# Patient Record
Sex: MALE | Race: WHITE | Employment: OTHER | URBAN - METROPOLITAN AREA
[De-identification: names, ages, dates, MRNs, and addresses within clinical notes are randomized per-mention and may not be internally consistent; named-entity substitution may affect disease eponyms.]

---

## 2018-11-29 ENCOUNTER — ANESTHESIA EVENT (OUTPATIENT)
Dept: ENDOSCOPY | Facility: HOSPITAL | Age: 73
End: 2018-11-29
Payer: MEDICARE

## 2018-11-29 ENCOUNTER — HOSPITAL ENCOUNTER (OUTPATIENT)
Facility: HOSPITAL | Age: 73
Setting detail: HOSPITAL OUTPATIENT SURGERY
Discharge: HOME | End: 2018-11-29
Attending: INTERNAL MEDICINE | Admitting: INTERNAL MEDICINE
Payer: MEDICARE

## 2018-11-29 ENCOUNTER — ANESTHESIA (OUTPATIENT)
Dept: ENDOSCOPY | Facility: HOSPITAL | Age: 73
End: 2018-11-29
Payer: MEDICARE

## 2018-11-29 VITALS
HEIGHT: 66 IN | SYSTOLIC BLOOD PRESSURE: 127 MMHG | WEIGHT: 178 LBS | RESPIRATION RATE: 12 BRPM | TEMPERATURE: 97 F | DIASTOLIC BLOOD PRESSURE: 75 MMHG | OXYGEN SATURATION: 97 % | BODY MASS INDEX: 28.61 KG/M2 | HEART RATE: 73 BPM

## 2018-11-29 DIAGNOSIS — K86.2 PANCREAS CYST: ICD-10-CM

## 2018-11-29 DIAGNOSIS — Z86.010 HISTORY OF ADENOMATOUS POLYP OF COLON: ICD-10-CM

## 2018-11-29 PROCEDURE — 0DBP8ZX EXCISION OF RECTUM, VIA NATURAL OR ARTIFICIAL OPENING ENDOSCOPIC, DIAGNOSTIC: ICD-10-PCS | Performed by: INTERNAL MEDICINE

## 2018-11-29 PROCEDURE — 0DBN8ZX EXCISION OF SIGMOID COLON, VIA NATURAL OR ARTIFICIAL OPENING ENDOSCOPIC, DIAGNOSTIC: ICD-10-PCS | Performed by: INTERNAL MEDICINE

## 2018-11-29 PROCEDURE — BD47ZZZ ULTRASONOGRAPHY OF GASTROINTESTINAL TRACT: ICD-10-PCS | Performed by: INTERNAL MEDICINE

## 2018-11-29 PROCEDURE — 0DBK8ZX EXCISION OF ASCENDING COLON, VIA NATURAL OR ARTIFICIAL OPENING ENDOSCOPIC, DIAGNOSTIC: ICD-10-PCS | Performed by: INTERNAL MEDICINE

## 2018-11-29 PROCEDURE — 88305 TISSUE EXAM BY PATHOLOGIST: CPT | Performed by: INTERNAL MEDICINE

## 2018-11-29 PROCEDURE — 0DJ08ZZ INSPECTION OF UPPER INTESTINAL TRACT, VIA NATURAL OR ARTIFICIAL OPENING ENDOSCOPIC: ICD-10-PCS | Performed by: INTERNAL MEDICINE

## 2018-11-29 PROCEDURE — 82962 GLUCOSE BLOOD TEST: CPT

## 2018-11-29 RX ORDER — DEXTROSE MONOHYDRATE 25 G/50ML
50 INJECTION, SOLUTION INTRAVENOUS
Status: DISCONTINUED | OUTPATIENT
Start: 2018-11-29 | End: 2018-11-29

## 2018-11-29 RX ORDER — NALOXONE HYDROCHLORIDE 0.4 MG/ML
80 INJECTION, SOLUTION INTRAMUSCULAR; INTRAVENOUS; SUBCUTANEOUS AS NEEDED
Status: DISCONTINUED | OUTPATIENT
Start: 2018-11-29 | End: 2018-11-29

## 2018-11-29 RX ORDER — SODIUM CHLORIDE, SODIUM LACTATE, POTASSIUM CHLORIDE, CALCIUM CHLORIDE 600; 310; 30; 20 MG/100ML; MG/100ML; MG/100ML; MG/100ML
INJECTION, SOLUTION INTRAVENOUS CONTINUOUS PRN
Status: DISCONTINUED | OUTPATIENT
Start: 2018-11-29 | End: 2018-11-29 | Stop reason: SURG

## 2018-11-29 RX ORDER — LIDOCAINE HYDROCHLORIDE 10 MG/ML
INJECTION, SOLUTION EPIDURAL; INFILTRATION; INTRACAUDAL; PERINEURAL AS NEEDED
Status: DISCONTINUED | OUTPATIENT
Start: 2018-11-29 | End: 2018-11-29 | Stop reason: SURG

## 2018-11-29 RX ORDER — SODIUM CHLORIDE, SODIUM LACTATE, POTASSIUM CHLORIDE, CALCIUM CHLORIDE 600; 310; 30; 20 MG/100ML; MG/100ML; MG/100ML; MG/100ML
INJECTION, SOLUTION INTRAVENOUS CONTINUOUS
Status: DISCONTINUED | OUTPATIENT
Start: 2018-11-29 | End: 2018-11-29

## 2018-11-29 RX ADMIN — SODIUM CHLORIDE, SODIUM LACTATE, POTASSIUM CHLORIDE, CALCIUM CHLORIDE: 600; 310; 30; 20 INJECTION, SOLUTION INTRAVENOUS at 12:26:00

## 2018-11-29 RX ADMIN — LIDOCAINE HYDROCHLORIDE 50 MG: 10 INJECTION, SOLUTION EPIDURAL; INFILTRATION; INTRACAUDAL; PERINEURAL at 12:30:00

## 2018-11-29 NOTE — ANESTHESIA PREPROCEDURE EVALUATION
Anesthesia PreOp Note    HPI:     Bowen Robert is a 75year old male who presents for preoperative consultation requested by: Avani Frey MD    Date of Surgery: 11/29/2018    Procedure(s):  ENDOSCOPIC ULTRASOUND (EUS)  COLONOSCOPY  Indication: Pan Take 10 mg by mouth daily. Disp:  Rfl:  11/29/2014   MetFORMIN HCl (GLUCOPHAGE) 1000 MG Oral Tab Take 1,000 mg by mouth 2 (two) times daily with meals.  Disp:  Rfl:  11/30/2014 at Unknown time   Atorvastatin Calcium (LIPITOR) 20 MG Oral Tab Take 20 mg by mo Results   Component Value Date    PGLU 160 (H) 11/29/2018          Vital Signs: Body mass index is 28.73 kg/m². height is 1.676 m (5' 6\") and weight is 80.7 kg (178 lb). His blood pressure is 153/85 and his pulse is 75.  His respiration is 15 and oxygen

## 2018-11-29 NOTE — H&P
PATIENT NAME: Garo Melgoza  MRN: [id number]  DATE OF OPERATION: 11/29/2018  PREOPERATIVE DIAGNOSIS:   1. Pancreatic cysts  2.  Surveillance colonoscopy, personal history of colon   POSTOPERATIVE DIAGNOSES:  1. Multiple pancreatic cysts without alarming f identified by the ileocecal valve and appendiceal orifice. The base of the cecum was normal with no polyps or masses seen. The colonoscope was then withdrawn and the mucosa was further carefully inspected.     3 polyps were seen and removed with cold snare

## 2018-11-29 NOTE — OPERATIVE REPORT
PATIENT NAME: Flor Escobedo  MRN: [id number]  DATE OF OPERATION: 11/29/2018  PREOPERATIVE DIAGNOSIS:   1. Pancreatic cysts  2.  Surveillance colonoscopy, personal history of colon   POSTOPERATIVE DIAGNOSES:  1. Multiple pancreatic cysts without alarming f identified by the ileocecal valve and appendiceal orifice. The base of the cecum was normal with no polyps or masses seen. The colonoscope was then withdrawn and the mucosa was further carefully inspected.     3 polyps were seen and removed with cold snare

## 2018-11-29 NOTE — ANESTHESIA POSTPROCEDURE EVALUATION
Patient: Makenzie Simon    Procedure Summary     Date:  11/29/18 Room / Location:  [facility] ENDOSCOPY 01 / [facility] ENDOSCOPY    Anesthesia Start:  1226 Anesthesia Stop:  0550    Procedures:       ENDOSCOPIC ULTRASOUND (EUS) (N/A )      COLONOSCOPY (N/A ) Guadalupe Díaz

## 2018-12-04 NOTE — PROGRESS NOTES
12/4/2018  [address] [place] [address]    Dear [facility],     The  biopsy/pathology findings from your colonoscopy showed:  1.   Colon polyps: hyperplastic polyps which are benign non-cancerous growths that were remov

## (undated) DEVICE — Device: Brand: BALLOON3

## (undated) DEVICE — CANNULA NASAL 02/C02 ADULT

## (undated) DEVICE — SNARE 9MM 230CM 2.4MM EXACTO

## (undated) DEVICE — CONMED SCOPE SAVER BITE BLOCK, 20X27 MM: Brand: SCOPE SAVER

## (undated) DEVICE — ENDOSCOPY PACK - LOWER: Brand: MEDLINE INDUSTRIES, INC.

## (undated) DEVICE — Device: Brand: DEFENDO AIR/WATER/SUCTION AND BIOPSY VALVE

## (undated) DEVICE — LINE MNTR ADLT SET O2 INTMD

## (undated) DEVICE — ENDOSCOPY PACK UPPER: Brand: MEDLINE INDUSTRIES, INC.

## (undated) NOTE — LETTER
12/4/2018          [address] [place] [address]    Dear [facility],     The  biopsy/pathology findings from your colonoscopy showed:  1.   Colon polyps: hyperplastic polyps which are benign non-cancerous growths kassandra